# Patient Record
Sex: FEMALE | Race: BLACK OR AFRICAN AMERICAN | Employment: FULL TIME | URBAN - METROPOLITAN AREA
[De-identification: names, ages, dates, MRNs, and addresses within clinical notes are randomized per-mention and may not be internally consistent; named-entity substitution may affect disease eponyms.]

---

## 2018-09-09 ENCOUNTER — HOSPITAL ENCOUNTER (EMERGENCY)
Age: 18
Discharge: HOME | End: 2018-09-09
Attending: EMERGENCY MEDICINE
Payer: COMMERCIAL

## 2018-09-09 VITALS
WEIGHT: 182 LBS | HEIGHT: 60 IN | BODY MASS INDEX: 35.73 KG/M2 | SYSTOLIC BLOOD PRESSURE: 116 MMHG | DIASTOLIC BLOOD PRESSURE: 84 MMHG | TEMPERATURE: 98.4 F | RESPIRATION RATE: 18 BRPM | HEART RATE: 84 BPM | OXYGEN SATURATION: 99 %

## 2018-09-09 DIAGNOSIS — J06.9 ACUTE UPPER RESPIRATORY INFECTION: Primary | ICD-10-CM

## 2018-09-09 PROCEDURE — 99282 EMERGENCY DEPT VISIT SF MDM: CPT

## 2018-09-09 RX ORDER — AZITHROMYCIN 250 MG/1
TABLET, FILM COATED ORAL
Qty: 6 TAB | Refills: 0 | Status: SHIPPED | OUTPATIENT
Start: 2018-09-09 | End: 2018-09-14

## 2018-09-09 RX ORDER — GUAIFENESIN 100 MG/5ML
400 SOLUTION ORAL
Qty: 1 BOTTLE | Refills: 0 | Status: SHIPPED | OUTPATIENT
Start: 2018-09-09 | End: 2018-11-19

## 2018-09-09 RX ORDER — IBUPROFEN 600 MG/1
600 TABLET ORAL
Qty: 20 TAB | Refills: 0 | Status: SHIPPED | OUTPATIENT
Start: 2018-09-09 | End: 2018-11-19

## 2018-09-09 RX ORDER — OXYMETAZOLINE HCL 0.05 %
2 SPRAY, NON-AEROSOL (ML) NASAL 2 TIMES DAILY
Qty: 1 EACH | Refills: 0 | Status: SHIPPED | OUTPATIENT
Start: 2018-09-09 | End: 2018-09-12

## 2018-09-09 NOTE — DISCHARGE INSTRUCTIONS
Learning About COPD and Upper Respiratory Infections  What are upper respiratory infections? An upper respiratory infection, also called a URI, is an infection of the nose, sinuses, or throat. Viruses or bacteria can cause URIs. Colds, the flu, and sinusitis are examples of URIs. These infections are spread by coughs, sneezes, and close contact. How do these infections affect COPD? A URI can worsen COPD symptoms, such as having too much mucus in your lungs, coughing, or being short of breath. What can you do to manage most infections at home? · Do not smoke. Avoid secondhand smoke. · Take an over-the-counter pain medicine, such as acetaminophen (Tylenol), ibuprofen (Advil, Motrin), or naproxen (Aleve). Read and follow all instructions on the label. · Be careful when taking over-the-counter cold or flu medicines and Tylenol at the same time. Many of these medicines have acetaminophen, which is Tylenol. Read the labels to make sure that you are not taking more than the recommended dose. Too much acetaminophen (Tylenol) can be harmful. · If your doctor prescribed antibiotics, take them as directed. Do not stop taking them just because you feel better. You need to take the full course of antibiotics. · Ask your doctor about cough medicines and decongestants. Some doctors recommend these medicines, while others feel that they do not help. · Learn breathing techniques for COPD, such as breathing through pursed lips. These techniques can help you breathe easier. What can you do to prevent these infections? Stay healthy  · Do not smoke. This is the most important step you can take to prevent more damage to your lungs. If you need help quitting, talk to your doctor about stop-smoking programs and medicines. These can increase your chances of quitting for good. · Avoid secondhand smoke, air pollution, and high altitudes. Also avoid cold, dry air and hot, humid air.  Stay at home with your windows closed when air pollution is bad. · Get a flu shot every year. · Get a pneumococcal vaccine shot. If you have had one before, ask your doctor whether you need another dose. · If you must be around people with colds or the flu, wash your hands often. Exercise and eat well  · If your doctor recommends it, get more exercise. Walking is a good choice. Bit by bit, increase the amount you walk every day. Try for at least 30 minutes on most days of the week. · Eat regular, well-balanced meals. Eating right keeps your energy levels up and helps your body fight infection. · Get plenty of rest and sleep. Follow-up care is a key part of your treatment and safety. Be sure to make and go to all appointments, and call your doctor if you are having problems. It's also a good idea to know your test results and keep a list of the medicines you take. Where can you learn more? Go to http://josé-thuy.info/. Enter Y271 in the search box to learn more about \"Learning About COPD and Upper Respiratory Infections. \"  Current as of: December 6, 2017  Content Version: 11.7  © 1599-8911 [employer]. Care instructions adapted under license by [employer] (which disclaims liability or warranty for this information). If you have questions about a medical condition or this instruction, always ask your healthcare professional. [address] any warranty or liability for your use of this information.
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
## 2018-09-09 NOTE — LETTER
[facility] EMERGENCY DEPT 
[address] [facility] [address] [id number] 
[phone] Work/School Note Date: 9/9/2018 To Whom It May concern: 
 
Pauly Fabian was seen and treated today in the emergency room by the following provider(s): 
Attending Provider: Sandra School, MD 
Nurse Practitioner: Laura Quan NP. Pauly Fabian may return to work on 9-11. Sincerely, Laura Quan NP

## 2018-09-10 NOTE — ED NOTES
Patient (s)  given copy of dc instructions and 4 script(s). Patient (s)  verbalized understanding of instructions and script (s). Patient given a current medication reconciliation form and verbalized understanding of their medications. Patient (s) verbalized understanding of the importance of discussing medications with  his or her physician or clinic they will be following up with. Patient alert and oriented and in no acute distress. Patient discharged home ambulatory with friend/family.

## 2018-09-10 NOTE — ED NOTES
Emergency Department Nursing Plan of Care       The Nursing Plan of Care is developed from the Nursing assessment and Emergency Department Attending provider initial evaluation. The plan of care may be reviewed in the ED Provider note.     The Plan of Care was developed with the following considerations:   Patient / Family readiness to learn indicated by:verbalized understanding  Persons(s) to be included in education: patient  Barriers to Learning/Limitations:No    Signed     Racheal Ponce RN    9/9/2018   8:10 PM

## 2018-11-19 ENCOUNTER — HOSPITAL ENCOUNTER (EMERGENCY)
Age: 18
Discharge: HOME | End: 2018-11-19
Attending: EMERGENCY MEDICINE
Payer: COMMERCIAL

## 2018-11-19 VITALS
WEIGHT: 182 LBS | HEART RATE: 70 BPM | TEMPERATURE: 98.3 F | SYSTOLIC BLOOD PRESSURE: 121 MMHG | DIASTOLIC BLOOD PRESSURE: 69 MMHG | BODY MASS INDEX: 35.73 KG/M2 | OXYGEN SATURATION: 100 % | RESPIRATION RATE: 17 BRPM | HEIGHT: 60 IN

## 2018-11-19 DIAGNOSIS — R51.9 ACUTE NONINTRACTABLE HEADACHE, UNSPECIFIED HEADACHE TYPE: Primary | ICD-10-CM

## 2018-11-19 DIAGNOSIS — R11.0 NAUSEA WITHOUT VOMITING: ICD-10-CM

## 2018-11-19 LAB
ALBUMIN SERPL-MCNC: 3.2 G/DL (ref 3.5–5)
ALBUMIN/GLOB SERPL: 0.8 {RATIO} (ref 1.1–2.2)
ALP SERPL-CCNC: 63 U/L (ref 45–117)
ALT SERPL-CCNC: 15 U/L (ref 12–78)
ANION GAP SERPL CALC-SCNC: 5 MMOL/L (ref 5–15)
APPEARANCE UR: ABNORMAL
AST SERPL-CCNC: 13 U/L (ref 15–37)
BACTERIA URNS QL MICRO: ABNORMAL /HPF
BASOPHILS # BLD: 0.1 K/UL (ref 0–0.1)
BASOPHILS NFR BLD: 1 % (ref 0–1)
BILIRUB SERPL-MCNC: 0.2 MG/DL (ref 0.2–1)
BILIRUB UR QL: NEGATIVE
BUN SERPL-MCNC: 6 MG/DL (ref 6–20)
BUN/CREAT SERPL: 7 (ref 12–20)
CALCIUM SERPL-MCNC: 8.4 MG/DL (ref 8.5–10.1)
CHLORIDE SERPL-SCNC: 103 MMOL/L (ref 97–108)
CO2 SERPL-SCNC: 31 MMOL/L (ref 21–32)
COLOR UR: ABNORMAL
CREAT SERPL-MCNC: 0.87 MG/DL (ref 0.55–1.02)
DIFFERENTIAL METHOD BLD: NORMAL
EOSINOPHIL # BLD: 0.1 K/UL (ref 0–0.4)
EOSINOPHIL NFR BLD: 2 % (ref 0–7)
EPITH CASTS URNS QL MICRO: ABNORMAL /LPF
ERYTHROCYTE [DISTWIDTH] IN BLOOD BY AUTOMATED COUNT: 12.4 % (ref 11.5–14.5)
GLOBULIN SER CALC-MCNC: 4 G/DL (ref 2–4)
GLUCOSE SERPL-MCNC: 95 MG/DL (ref 65–100)
GLUCOSE UR STRIP.AUTO-MCNC: NEGATIVE MG/DL
HCG UR QL: NEGATIVE
HCT VFR BLD AUTO: 39 % (ref 35–47)
HGB BLD-MCNC: 12.5 G/DL (ref 11.5–16)
HGB UR QL STRIP: ABNORMAL
IMM GRANULOCYTES # BLD: 0 K/UL (ref 0–0.04)
IMM GRANULOCYTES NFR BLD AUTO: 0 % (ref 0–0.5)
KETONES UR QL STRIP.AUTO: NEGATIVE MG/DL
LEUKOCYTE ESTERASE UR QL STRIP.AUTO: ABNORMAL
LIPASE SERPL-CCNC: 84 U/L (ref 73–393)
LYMPHOCYTES # BLD: 2 K/UL (ref 0.8–3.5)
LYMPHOCYTES NFR BLD: 35 % (ref 12–49)
MCH RBC QN AUTO: 27.1 PG (ref 26–34)
MCHC RBC AUTO-ENTMCNC: 32.1 G/DL (ref 30–36.5)
MCV RBC AUTO: 84.4 FL (ref 80–99)
MONOCYTES # BLD: 0.5 K/UL (ref 0–1)
MONOCYTES NFR BLD: 8 % (ref 5–13)
MUCOUS THREADS URNS QL MICRO: ABNORMAL /LPF
NEUTS SEG # BLD: 3 K/UL (ref 1.8–8)
NEUTS SEG NFR BLD: 53 % (ref 32–75)
NITRITE UR QL STRIP.AUTO: NEGATIVE
NRBC # BLD: 0 K/UL (ref 0–0.01)
NRBC BLD-RTO: 0 PER 100 WBC
PH UR STRIP: 6.5 [PH] (ref 5–8)
PLATELET # BLD AUTO: 275 K/UL (ref 150–400)
PMV BLD AUTO: 10.2 FL (ref 8.9–12.9)
POTASSIUM SERPL-SCNC: 3.7 MMOL/L (ref 3.5–5.1)
PROT SERPL-MCNC: 7.2 G/DL (ref 6.4–8.2)
PROT UR STRIP-MCNC: 30 MG/DL
RBC # BLD AUTO: 4.62 M/UL (ref 3.8–5.2)
RBC #/AREA URNS HPF: ABNORMAL /HPF (ref 0–5)
SODIUM SERPL-SCNC: 139 MMOL/L (ref 136–145)
SP GR UR REFRACTOMETRY: 1.02 (ref 1–1.03)
UROBILINOGEN UR QL STRIP.AUTO: 1 EU/DL (ref 0.2–1)
WBC # BLD AUTO: 5.7 K/UL (ref 3.6–11)
WBC URNS QL MICRO: ABNORMAL /HPF (ref 0–4)
YEAST URNS QL MICRO: PRESENT

## 2018-11-19 PROCEDURE — 36415 COLL VENOUS BLD VENIPUNCTURE: CPT

## 2018-11-19 PROCEDURE — 85025 COMPLETE CBC W/AUTO DIFF WBC: CPT

## 2018-11-19 PROCEDURE — 81025 URINE PREGNANCY TEST: CPT

## 2018-11-19 PROCEDURE — 99284 EMERGENCY DEPT VISIT MOD MDM: CPT

## 2018-11-19 PROCEDURE — 81001 URINALYSIS AUTO W/SCOPE: CPT

## 2018-11-19 PROCEDURE — 80053 COMPREHEN METABOLIC PANEL: CPT

## 2018-11-19 PROCEDURE — 74011250637 HC RX REV CODE- 250/637: Performed by: EMERGENCY MEDICINE

## 2018-11-19 PROCEDURE — 83690 ASSAY OF LIPASE: CPT

## 2018-11-19 RX ORDER — IBUPROFEN 400 MG/1
800 TABLET ORAL
Status: COMPLETED | OUTPATIENT
Start: 2018-11-19 | End: 2018-11-19

## 2018-11-19 RX ORDER — IBUPROFEN 800 MG/1
800 TABLET ORAL
Qty: 30 TAB | Refills: 0 | Status: SHIPPED | OUTPATIENT
Start: 2018-11-19 | End: 2019-02-12

## 2018-11-19 RX ORDER — ONDANSETRON 4 MG/1
4 TABLET, ORALLY DISINTEGRATING ORAL
Qty: 10 TAB | Refills: 0 | Status: SHIPPED | OUTPATIENT
Start: 2018-11-19 | End: 2019-02-12

## 2018-11-19 RX ORDER — ONDANSETRON 4 MG/1
4 TABLET, ORALLY DISINTEGRATING ORAL
Status: COMPLETED | OUTPATIENT
Start: 2018-11-19 | End: 2018-11-19

## 2018-11-19 RX ADMIN — ONDANSETRON 4 MG: 4 TABLET, ORALLY DISINTEGRATING ORAL at 11:01

## 2018-11-19 RX ADMIN — IBUPROFEN 800 MG: 400 TABLET ORAL at 11:01

## 2018-11-19 NOTE — DISCHARGE INSTRUCTIONS
Headache: Care Instructions  Your Care Instructions    Headaches have many possible causes. Most headaches aren't a sign of a more serious problem, and they will get better on their own. Home treatment may help you feel better faster. The doctor has checked you carefully, but problems can develop later. If you notice any problems or new symptoms, get medical treatment right away. Follow-up care is a key part of your treatment and safety. Be sure to make and go to all appointments, and call your doctor if you are having problems. It's also a good idea to know your test results and keep a list of the medicines you take. How can you care for yourself at home? · Do not drive if you have taken a prescription pain medicine. · Rest in a quiet, dark room until your headache is gone. Close your eyes and try to relax or go to sleep. Don't watch TV or read. · Put a cold, moist cloth or cold pack on the painful area for 10 to 20 minutes at a time. Put a thin cloth between the cold pack and your skin. · Use a warm, moist towel or a heating pad set on low to relax tight shoulder and neck muscles. · Have someone gently massage your neck and shoulders. · Take pain medicines exactly as directed. ? If the doctor gave you a prescription medicine for pain, take it as prescribed. ? If you are not taking a prescription pain medicine, ask your doctor if you can take an over-the-counter medicine. · Be careful not to take pain medicine more often than the instructions allow, because you may get worse or more frequent headaches when the medicine wears off. · Do not ignore new symptoms that occur with a headache, such as a fever, weakness or numbness, vision changes, or confusion. These may be signs of a more serious problem. To prevent headaches  · Keep a headache diary so you can figure out what triggers your headaches. Avoiding triggers may help you prevent headaches.  Record when each headache began, how long it lasted, and what the pain was like (throbbing, aching, stabbing, or dull). Write down any other symptoms you had with the headache, such as nausea, flashing lights or dark spots, or sensitivity to bright light or loud noise. Note if the headache occurred near your period. List anything that might have triggered the headache, such as certain foods (chocolate, cheese, wine) or odors, smoke, bright light, stress, or lack of sleep. · Find healthy ways to deal with stress. Headaches are most common during or right after stressful times. Take time to relax before and after you do something that has caused a headache in the past.  · Try to keep your muscles relaxed by keeping good posture. Check your jaw, face, neck, and shoulder muscles for tension, and try relaxing them. When sitting at a desk, change positions often, and stretch for 30 seconds each hour. · Get plenty of sleep and exercise. · Eat regularly and well. Long periods without food can trigger a headache. · Treat yourself to a massage. Some people find that regular massages are very helpful in relieving tension. · Limit caffeine by not drinking too much coffee, tea, or soda. But don't quit caffeine suddenly, because that can also give you headaches. · Reduce eyestrain from computers by blinking frequently and looking away from the computer screen every so often. Make sure you have proper eyewear and that your monitor is set up properly, about an arm's length away. · Seek help if you have depression or anxiety. Your headaches may be linked to these conditions. Treatment can both prevent headaches and help with symptoms of anxiety or depression. When should you call for help? Call 911 anytime you think you may need emergency care. For example, call if:    · You have signs of a stroke. These may include:  ? Sudden numbness, paralysis, or weakness in your face, arm, or leg, especially on only one side of your body. ? Sudden vision changes.   ? Sudden trouble speaking. ? Sudden confusion or trouble understanding simple statements. ? Sudden problems with walking or balance. ? A sudden, severe headache that is different from past headaches.    Call your doctor now or seek immediate medical care if:    · You have a new or worse headache.     · Your headache gets much worse. Where can you learn more? Go to http://josé-htuy.info/. Enter M271 in the search box to learn more about \"Headache: Care Instructions. \"  Current as of: June 4, 2018  Content Version: 11.8  © 3256-6376 [employer]. Care instructions adapted under license by [employer] (which disclaims liability or warranty for this information). If you have questions about a medical condition or this instruction, always ask your healthcare professional. [address] any warranty or liability for your use of this information. Nausea and Vomiting: Care Instructions  Your Care Instructions    When you are nauseated, you may feel weak and sweaty and notice a lot of saliva in your mouth. Nausea often leads to vomiting. Most of the time you do not need to worry about nausea and vomiting, but they can be signs of other illnesses. Two common causes of nausea and vomiting are stomach flu and food poisoning. Nausea and vomiting from viral stomach flu will usually start to improve within 24 hours. Nausea and vomiting from food poisoning may last from 12 to 48 hours. The doctor has checked you carefully, but problems can develop later. If you notice any problems or new symptoms, get medical treatment right away. Follow-up care is a key part of your treatment and safety. Be sure to make and go to all appointments, and call your doctor if you are having problems. It's also a good idea to know your test results and keep a list of the medicines you take. How can you care for yourself at home?   · To prevent dehydration, drink plenty of fluids, enough so that your urine is light yellow or clear like water. Choose water and other caffeine-free clear liquids until you feel better. If you have kidney, heart, or liver disease and have to limit fluids, talk with your doctor before you increase the amount of fluids you drink. · Rest in bed until you feel better. · When you are able to eat, try clear soups, mild foods, and liquids until all symptoms are gone for 12 to 48 hours. Other good choices include dry toast, crackers, cooked cereal, and gelatin dessert, such as Jell-O. When should you call for help? Call 911 anytime you think you may need emergency care. For example, call if:    · You passed out (lost consciousness).    Call your doctor now or seek immediate medical care if:    · You have symptoms of dehydration, such as:  ? Dry eyes and a dry mouth. ? Passing only a little dark urine. ? Feeling thirstier than usual.     · You have new or worsening belly pain.     · You have a new or higher fever.     · You vomit blood or what looks like coffee grounds.    Watch closely for changes in your health, and be sure to contact your doctor if:    · You have ongoing nausea and vomiting.     · Your vomiting is getting worse.     · Your vomiting lasts longer than 2 days.     · You are not getting better as expected. Where can you learn more? Go to http://josé-thuy.info/. Enter 25 [id number] in the search box to learn more about \"Nausea and Vomiting: Care Instructions. \"  Current as of: November 20, 2017  Content Version: 11.8  © 7110-0287 [employer]. Care instructions adapted under license by [employer] (which disclaims liability or warranty for this information). If you have questions about a medical condition or this instruction, always ask your healthcare professional. [address] any warranty or liability for your use of this information.

## 2018-11-19 NOTE — ED NOTES
Pt given printed discharge instructions and 2 script(s). Pt verbalized understanding of instructions and script(s). Pt verbalized importance of following up with PCP. Pt alert and oriented, in no acute distress, ambulatory with friend.
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
## 2018-11-19 NOTE — ED NOTES
Pt reports frontal headache that began Saturday night, reports frequent headaches, pt still having a headache and it is currently on the right side; reports nausea and dizziness with standing, denies vomiting

## 2018-11-19 NOTE — LETTER
[facility] EMERGENCY DEPT 
[address] [facility] [address] [address] [id number] [phone] Work/School Note Date: 11/19/2018 To Whom It May concern: 
 
Olena Fuller was seen and treated today in the emergency room by the following provider(s): 
Attending Provider: Mireille Rodriguez MD. Olena Fuller may return to work on 11/20/18. Sincerely, May Villanueva MD

## 2019-02-12 ENCOUNTER — HOSPITAL ENCOUNTER (EMERGENCY)
Age: 19
Discharge: HOME | End: 2019-02-12
Attending: EMERGENCY MEDICINE
Payer: COMMERCIAL

## 2019-02-12 VITALS
BODY MASS INDEX: 35.3 KG/M2 | WEIGHT: 187 LBS | DIASTOLIC BLOOD PRESSURE: 85 MMHG | HEIGHT: 61 IN | HEART RATE: 97 BPM | TEMPERATURE: 98.8 F | RESPIRATION RATE: 18 BRPM | OXYGEN SATURATION: 100 % | SYSTOLIC BLOOD PRESSURE: 132 MMHG

## 2019-02-12 DIAGNOSIS — J10.1 INFLUENZA A: Primary | ICD-10-CM

## 2019-02-12 LAB
FLUAV AG NPH QL IA: POSITIVE
FLUBV AG NOSE QL IA: NEGATIVE

## 2019-02-12 PROCEDURE — 74011250637 HC RX REV CODE- 250/637: Performed by: EMERGENCY MEDICINE

## 2019-02-12 PROCEDURE — 87804 INFLUENZA ASSAY W/OPTIC: CPT

## 2019-02-12 PROCEDURE — 99283 EMERGENCY DEPT VISIT LOW MDM: CPT

## 2019-02-12 RX ORDER — ONDANSETRON 4 MG/1
4 TABLET, ORALLY DISINTEGRATING ORAL
Status: COMPLETED | OUTPATIENT
Start: 2019-02-12 | End: 2019-02-12

## 2019-02-12 RX ORDER — ACETAMINOPHEN 500 MG
1000 TABLET ORAL ONCE
Status: COMPLETED | OUTPATIENT
Start: 2019-02-12 | End: 2019-02-12

## 2019-02-12 RX ORDER — ONDANSETRON 4 MG/1
4 TABLET, ORALLY DISINTEGRATING ORAL
Qty: 10 TAB | Refills: 0 | Status: SHIPPED | OUTPATIENT
Start: 2019-02-12 | End: 2020-01-19

## 2019-02-12 RX ORDER — OSELTAMIVIR PHOSPHATE 75 MG/1
75 CAPSULE ORAL DAILY
Qty: 4 CAP | Refills: 0 | Status: SHIPPED | OUTPATIENT
Start: 2019-02-12 | End: 2019-02-16

## 2019-02-12 RX ORDER — OSELTAMIVIR PHOSPHATE 75 MG/1
75 CAPSULE ORAL
Status: COMPLETED | OUTPATIENT
Start: 2019-02-12 | End: 2019-02-12

## 2019-02-12 RX ADMIN — ACETAMINOPHEN 1000 MG: 500 TABLET, FILM COATED ORAL at 19:30

## 2019-02-12 RX ADMIN — ONDANSETRON 4 MG: 4 TABLET, ORALLY DISINTEGRATING ORAL at 19:30

## 2019-02-12 RX ADMIN — OSELTAMIVIR PHOSPHATE 75 MG: 75 CAPSULE ORAL at 20:34

## 2019-02-12 NOTE — LETTER
[facility] EMERGENCY DEPT 
[address] [facility] [address] [facility] [address] [id number] [phone] Work/School Note Date: 2/12/2019 To Whom It May concern: 
 
Melania Villegas was seen and treated today in the emergency room by the following provider(s): 
Attending Provider: Kavin Freedman MD. Melania Villegas may return to work on 2/15/19 or when you have not had any fever for at least 24 hours. Sincerely, Kavin Freedman MD
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
## 2019-02-13 NOTE — ED NOTES
Patient  given copy of dc instructions and 1 script sent to pharmacy  Patient  verbalized understanding of instructions and script (s). Patient given a current medication reconciliation form and verbalized understanding of their medications. Patient verbalized understanding of the importance of discussing medications with  his or her physician or clinic they will be following up with. Patient alert and oriented and in no acute distress. Patient discharged home ambulatory.

## 2019-02-13 NOTE — ED TRIAGE NOTES
Pt c/o productive cough x 3 days with yellow mucous, fevers at home, mild abd pain, diarrhea, and 3 episodes of vomiting.

## 2019-02-13 NOTE — DISCHARGE INSTRUCTIONS
Patient Education        Influenza (Flu): Care Instructions  Your Care Instructions    Influenza (flu) is an infection in the lungs and breathing passages. It is caused by the influenza virus. There are different strains, or types, of the flu virus from year to year. Unlike the common cold, the flu comes on suddenly and the symptoms, such as a cough, congestion, fever, chills, fatigue, aches, and pains, are more severe. These symptoms may last up to 10 days. Although the flu can make you feel very sick, it usually doesn't cause serious health problems. Home treatment is usually all you need for flu symptoms. But your doctor may prescribe antiviral medicine to prevent other health problems, such as pneumonia, from developing. Older people and those who have a long-term health condition, such as lung disease, are most at risk for having pneumonia or other health problems. Follow-up care is a key part of your treatment and safety. Be sure to make and go to all appointments, and call your doctor if you are having problems. It's also a good idea to know your test results and keep a list of the medicines you take. How can you care for yourself at home? · Get plenty of rest.  · Drink plenty of fluids, enough so that your urine is light yellow or clear like water. If you have kidney, heart, or liver disease and have to limit fluids, talk with your doctor before you increase the amount of fluids you drink. · Take an over-the-counter pain medicine if needed, such as acetaminophen (Tylenol), ibuprofen (Advil, Motrin), or naproxen (Aleve), to relieve fever, headache, and muscle aches. Read and follow all instructions on the label. No one younger than 20 should take aspirin. It has been linked to Reye syndrome, a serious illness. · Do not smoke. Smoking can make the flu worse. If you need help quitting, talk to your doctor about stop-smoking programs and medicines.  These can increase your chances of quitting for good.  · Breathe moist air from a hot shower or from a sink filled with hot water to help clear a stuffy nose. · Before you use cough and cold medicines, check the label. These medicines may not be safe for young children or for people with certain health problems. · If the skin around your nose and lips becomes sore, put some petroleum jelly on the area. · To ease coughing:  ? Drink fluids to soothe a scratchy throat. ? Suck on cough drops or plain hard candy. ? Take an over-the-counter cough medicine that contains dextromethorphan to help you get some sleep. Read and follow all instructions on the label. ? Raise your head at night with an extra pillow. This may help you rest if coughing keeps you awake. · Take any prescribed medicine exactly as directed. Call your doctor if you think you are having a problem with your medicine. To avoid spreading the flu  · Wash your hands regularly, and keep your hands away from your face. · Stay home from school, work, and other public places until you are feeling better and your fever has been gone for at least 24 hours. The fever needs to have gone away on its own without the help of medicine. · Ask people living with you to talk to their doctors about preventing the flu. They may get antiviral medicine to keep from getting the flu from you. · To prevent the flu in the future, get a flu vaccine every fall. Encourage people living with you to get the vaccine. · Cover your mouth when you cough or sneeze. When should you call for help? Call 911 anytime you think you may need emergency care.  For example, call if:    · You have severe trouble breathing.    Call your doctor now or seek immediate medical care if:    · You have new or worse trouble breathing.     · You seem to be getting much sicker.     · You feel very sleepy or confused.     · You have a new or higher fever.     · You get a new rash.    Watch closely for changes in your health, and be sure to contact your doctor if:    · You begin to get better and then get worse.     · You are not getting better after 1 week. Where can you learn more? Go to http://josé-thuy.info/. Enter Y434 in the search box to learn more about \"Influenza (Flu): Care Instructions. \"  Current as of: September 5, 2018  Content Version: 11.9  © 2179-1702 [employer]. Care instructions adapted under license by [employer] (which disclaims liability or warranty for this information). If you have questions about a medical condition or this instruction, always ask your healthcare professional. [place] [address] any warranty or liability for your use of this information.

## 2019-02-13 NOTE — ED NOTES
Pt arrived to ED  with c/o generalized body ache with n/v x 1 day. Patient is 8 weeks pregnant. . Pt is in no acute distress. Will continue to monitor. See nursing assessment. Safety precautions in place; call light within reach. Emergency Department Nursing Plan of Care       The Nursing Plan of Care is developed from the Nursing assessment and Emergency Department Attending provider initial evaluation. The plan of care may be reviewed in the ED Provider note.     The Plan of Care was developed with the following considerations:   Patient / Family readiness to learn indicated by:verbalized understanding  Persons(s) to be included in education: patient  Barriers to Learning/Limitations:No    Signed     Jason Arce RN    2/12/2019   7:21 PM

## 2020-01-19 ENCOUNTER — OFFICE VISIT (OUTPATIENT)
Dept: URGENT CARE | Age: 20
End: 2020-01-19

## 2020-01-19 VITALS
DIASTOLIC BLOOD PRESSURE: 76 MMHG | WEIGHT: 203 LBS | SYSTOLIC BLOOD PRESSURE: 129 MMHG | HEART RATE: 82 BPM | BODY MASS INDEX: 38.33 KG/M2 | HEIGHT: 61 IN | OXYGEN SATURATION: 100 % | TEMPERATURE: 97.3 F | RESPIRATION RATE: 18 BRPM
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
## 2020-01-19 DIAGNOSIS — Z3A.22 22 WEEKS GESTATION OF PREGNANCY: ICD-10-CM

## 2020-01-19 DIAGNOSIS — S80.11XA CONTUSION OF RIGHT LOWER EXTREMITY, INITIAL ENCOUNTER: ICD-10-CM

## 2020-01-19 DIAGNOSIS — S29.012A UPPER BACK STRAIN, INITIAL ENCOUNTER: Primary | ICD-10-CM
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
## 2020-01-19 NOTE — PATIENT INSTRUCTIONS
Go to the emergency department for any abdominal pain, vaginal discharge, vaginal bleeding   Otherwise follow up with PCP in 1 week for re evaluation. Cool or warm compresses  The only medication you can take for pain while pregnant is over the counter tylenol- take as directed. Musculoskeletal Pain: Care Instructions  Your Care Instructions    Different problems with the bones, muscles, nerves, ligaments, and tendons in the body can cause pain. One or more areas of your body may ache or burn. Or they may feel tired, stiff, or sore. The medical term for this type of pain is musculoskeletal pain. It can have many different causes. Sometimes the pain is caused by an injury such as a strain or sprain. Or you might have pain from using one part of your body in the same way over and over again. This is called overuse. In some cases, the cause of the pain is another health problem such as arthritis or fibromyalgia. The doctor will examine you and ask you questions about your health to help find the cause of your pain. Blood tests or imaging tests like an X-ray may also be helpful. But sometimes doctors can't find a cause of the pain. Treatment depends on your symptoms and the cause of the pain, if known. The doctor has checked you carefully, but problems can develop later. If you notice any problems or new symptoms, get medical treatment right away. Follow-up care is a key part of your treatment and safety. Be sure to make and go to all appointments, and call your doctor if you are having problems. It's also a good idea to know your test results and keep a list of the medicines you take. How can you care for yourself at home? · Rest until you feel better. · Do not do anything that makes the pain worse. · Be safe with medicines. Read and follow all instructions on the label. ? If the doctor gave you a prescription medicine for pain, take it as prescribed.   ? If you are not taking a prescription pain medicine, ask your doctor if you can take an over-the-counter medicine. · Put ice or a cold pack on the area for 10 to 20 minutes at a time to ease pain. Put a thin cloth between the ice and your skin. When should you call for help? Call your doctor now or seek immediate medical care if:    · You have new pain, or your pain gets worse.     · You have new symptoms such as a fever, a rash, or chills.    Watch closely for changes in your health, and be sure to contact your doctor if:    · You do not get better as expected. Where can you learn more? Go to http://josé-thuy.info/. Enter C095 in the search box to learn more about \"Musculoskeletal Pain: Care Instructions. \"  Current as of: March 28, 2019  Content Version: 12.2  © 0976-6946 [employer]. Care instructions adapted under license by [employer] (which disclaims liability or warranty for this information). If you have questions about a medical condition or this instruction, always ask your healthcare professional. [address] any warranty or liability for your use of this information.

## 2020-01-23 ENCOUNTER — OFFICE VISIT (OUTPATIENT)
Dept: FAMILY MEDICINE CLINIC | Age: 20
End: 2020-01-23

## 2020-01-23 VITALS
SYSTOLIC BLOOD PRESSURE: 110 MMHG | TEMPERATURE: 98.2 F | HEIGHT: 61 IN | HEART RATE: 103 BPM | BODY MASS INDEX: 38.51 KG/M2 | OXYGEN SATURATION: 99 % | WEIGHT: 204 LBS | DIASTOLIC BLOOD PRESSURE: 80 MMHG | RESPIRATION RATE: 18 BRPM

## 2020-01-23 DIAGNOSIS — S20.221A CONTUSION OF RIGHT SIDE OF BACK, INITIAL ENCOUNTER: Primary | ICD-10-CM

## 2020-01-23 DIAGNOSIS — S70.01XA CONTUSION OF RIGHT HIP, INITIAL ENCOUNTER: ICD-10-CM

## 2020-01-23 NOTE — PROGRESS NOTES
Here for f/u post fall. Still pain along right side. Sees Ob next week. Not taking tylenol. Denies bruising. Patient denies chest pain, dyspnea, unexpected weight change, unexpected pain, mood or memory changes. Visit Vitals  /80 (BP 1 Location: Left arm, BP Patient Position: Sitting)   Pulse (!) 103   Temp 98.2 °F (36.8 °C) (Oral)   Resp 18   Ht 5' 1\" (1.549 m)   Wt 204 lb (92.5 kg)   SpO2 99%   BMI 38.55 kg/m²     Patient alert and cooperative. Reviewed above. Assessment:  1. Musculoskeletal soreness post fall. Plan:  1. Check with OB to see if physical therapy is okay, for now can do hot showers, Tylenol, stretches. 2. Follow otherwise here prn.

## 2020-01-23 NOTE — PROGRESS NOTES
Rashi Dickens is a 22 y.o. female  HIPAA verified by two patient identifiers. Health Maintenance Due   Topic    DTaP/Tdap/Td series (7 - Td)    PAP AKA CERVICAL CYTOLOGY     Influenza Age 5 to Adult      Chief Complaint   Patient presents with    Follow-up     From [place] fall in [employer] , 23 weeks pregnant     Visit Vitals  /80 (BP 1 Location: Left arm, BP Patient Position: Sitting)   Pulse (!) 103   Temp 98.2 °F (36.8 °C) (Oral)   Resp 18   Ht 5' 1\" (1.549 m)   Wt 204 lb (92.5 kg)   SpO2 99%   BMI 38.55 kg/m²       Pain Scale: 5/10  Pain Location: Back    1. Have you been to the ER, urgent care clinic since your last visit? Hospitalized since your last visit? No    2. Have you seen or consulted any other health care providers outside of the [address] [place] [address] since your last visit? Include any pap smears or colon screening.  No

## 2020-02-04 ENCOUNTER — TELEPHONE (OUTPATIENT)
Dept: FAMILY MEDICINE CLINIC | Age: 20
End: 2020-02-04

## 2020-02-04 NOTE — TELEPHONE ENCOUNTER
----- Message from Ian Paz sent at 2/4/2020  2:13 PM EST -----  Regarding: Dr. Lan Tang stated that she would like advise that her OB-GYN has approved her for Physical Therapy and she would like to get scheduled. She states that Dr. Beti Griffin advised to let him know if she was approved. Best contact number is [phone].

## 2020-02-06 DIAGNOSIS — S39.92XA INJURY OF BACK DUE TO FALL, INITIAL ENCOUNTER: Primary | ICD-10-CM
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
## 2020-02-11 ENCOUNTER — TELEPHONE (OUTPATIENT)
Dept: FAMILY MEDICINE CLINIC | Age: 20
End: 2020-02-11

## 2020-02-11 NOTE — TELEPHONE ENCOUNTER
----- Message from Naz Montano sent at 2/11/2020 10:22 AM EST -----  Regarding: Dr. Radha Marks: [phone]  General Message/Vendor Calls    Caller's first and last [address]      Reason for call: pt is requesting a physical therapist . Pt is pregnant.       Callback required yes/no and why:yes      Best contact number(s):[phone]      Details to clarify the request: this is patients 2nd request      Naz Montano

## 2020-02-17 ENCOUNTER — TELEPHONE (OUTPATIENT)
Dept: FAMILY MEDICINE CLINIC | Age: 20
End: 2020-02-17

## 2020-02-17 NOTE — TELEPHONE ENCOUNTER
----- Message from Julian Rubinstein sent at 2/17/2020 10:52 AM EST -----  Regarding: Dr. Wheatley Main first and last name: José Miguel Showers  Reason for call:   told Pt to contact OBGYN about if she can do PT and the OBGYN, and pt been waiting for a call back so she can get a referral for PT as the Pt is currently pregnant.  Pt was last seen on 1/23/2020  Callback required yes/no and why: yes   Best contact number(s):  [phone] (update phone number on file to this one)  Details to clarify the request:

## 2020-02-17 NOTE — TELEPHONE ENCOUNTER
[facility]   [address]  [address] [place] [address]  Phone: [phone]  Fax: [phone]  [address] [place] [address] Ne - Referral Requisition      _________________________________________________________________________________________   Name:  Jamie Dover    MRN: [id number] : 2000              Sex: F   Address:  [address] [place] [address] Home: [address] [place] [address] Preferred*: [phone]      Order: REFERRAL TO PHYSICAL THERAPY  Class: Outpatient Referral                Referral Priority: Routine  Associated Dx: Injury of back due to fall, initial encounter (T25.83MQ,V30. [facility])     Comments:      If Coverage Data blank - Coverage Not Found. Primary Coverage Secondary Coverage   Payor: [facility] [[id number]]  Plan: [employer] [facility] [[id number]]  ID: [id number]     Subscriber Information:  Name: Moncho Webb  : 04/10/1980  Address: [address]: [facility]      Zip: [address]  Group No:  Payor:  []  Plan:  []  ID:      Subscriber Information:  Name: Edgar Ewing  : 2000  Address: [place] [address]  Zip: [address]  Group No:       Referral associated with order: [id number]     Referred by: Ayla Dorsey MD  Referral Reason: Specialty Services Required  Referred To:                                        Phone:  Fax:             Visits Requested: 1  Order Date: 2020  Expiration Date:          The patient stated that she no longer has united healthcare. She now has [employer] plus medicaid. The patient will like to go to [facility]. [facility] accepts [facility] plus. The patient do not need an authorization for medicaid.    [address] [place] [address]   Website  Address: Mayda Schilling[address]   Phone: [phone]  [phone]

## 2020-02-26 ENCOUNTER — TELEPHONE (OUTPATIENT)
Dept: FAMILY MEDICINE CLINIC | Age: 20
End: 2020-02-26

## 2020-02-26 NOTE — TELEPHONE ENCOUNTER
----- Message from Deb Sol sent at 2/25/2020 12:10 PM EST -----  Regarding: Dr. Pate Omer: [phone] (first and last name if not the patient or from practice): N/A  Caller's relationship to patient (if not from a practice):N/A  Best Contact : [phone]  Name of caller (if calling from a practice): N/A  Patient insurance Type [employer] Secondary  HMO   [facility] of [facility].    Specialist's title, first, and last name: N/A  Specialist Type: N/A  Office Phone Number: [phone]  Fax number: [phone]  Date and time of appointment: N/A

## 2020-02-27 ENCOUNTER — TELEPHONE (OUTPATIENT)
Dept: FAMILY MEDICINE CLINIC | Age: 20
End: 2020-02-27

## 2020-02-27 NOTE — TELEPHONE ENCOUNTER
----- Message from Nahum Ramirez sent at 2/27/2020  9:40 AM EST -----  Regarding: Read/telephone  Pt is requesting an order for PT at [facility] for upper back and right hip pain. She stated she did call on Tuesday and she has an appointment today at 1:00pm.Pts number is [phone] and [facility] number is [phone] and fax [phone].

## 2020-02-27 NOTE — TELEPHONE ENCOUNTER
Call to [phone] [facility] 149 and info on pt needed  Pt's appt at [address] is today at 1:30pm    Order and last OV note faxed to [phone] [phone]

## 2022-06-11 ENCOUNTER — OFFICE VISIT (OUTPATIENT)
Dept: URGENT CARE | Age: 22
End: 2022-06-11
Payer: COMMERCIAL

## 2022-06-11 VITALS
DIASTOLIC BLOOD PRESSURE: 65 MMHG | HEIGHT: 60 IN | OXYGEN SATURATION: 100 % | TEMPERATURE: 98.2 F | RESPIRATION RATE: 16 BRPM | SYSTOLIC BLOOD PRESSURE: 97 MMHG | WEIGHT: 179 LBS | BODY MASS INDEX: 35.14 KG/M2 | HEART RATE: 76 BPM

## 2022-06-11 DIAGNOSIS — H10.31 ACUTE CONJUNCTIVITIS OF RIGHT EYE, UNSPECIFIED ACUTE CONJUNCTIVITIS TYPE: Primary | ICD-10-CM

## 2022-06-11 PROCEDURE — 99212 OFFICE O/P EST SF 10 MIN: CPT | Performed by: FAMILY MEDICINE

## 2022-06-11 RX ORDER — ERYTHROMYCIN 5 MG/G
0.1 OINTMENT OPHTHALMIC EVERY 8 HOURS
Qty: 1 G | Refills: 0 | Status: SHIPPED | OUTPATIENT
Start: 2022-06-11 | End: 2022-06-14
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
## 2022-06-11 NOTE — PATIENT INSTRUCTIONS
What is pink eye? -- Pink eye is the everyday term people use to describe an infection or irritation of the eye. The medical term for pink eye is \"conjunctivitis. \"  If you have pink eye, your eye (or eyes) might:  ?Turn pink or red  ? Weep or ooze a gooey liquid  ? Become itchy or burn  ? Get stuck shut, especially when you first wake up  Pink eye can be caused by an infection, allergies, or an unknown irritation. Can you catch pink eye from someone else? -- Yes. When pink eye is caused by an infection, it can spread easily. Usually, people catch it from touching something that has been in contact with an infected person's eye. It can also be spread when an infected person touches someone else, and then that person touches their eyes. If someone you know has pink eye, avoid touching their pillowcases, towels, or other personal items. When should I see my doctor or nurse? -- See your doctor or nurse if your eye hurts, or if you still have trouble seeing clearly after blinking. If you do not have these problems, but think you might have pink eye, your doctor or nurse might be able to give you advice over the phone. Can pink eye be treated? -- Most cases of pink eye go away on their own without treatment. But some types of pink eye can be treated. When pink eye is caused by infection, it is usually caused by a virus, so antibiotics will not help. Still, pink eye caused by a virus can last several days. Pink eye caused by an infection with bacteria can be treated with antibiotic eye drops or gels. Pink eye caused by other problems can be treated with eye drops normally used to treat allergies. These drops will not cure the pink eye, but they can help with itchiness and irritation. When using eye drops for infection, do not touch your good eye after touching your infected eye. Also, do not touch the bottle or dropper directly onto one eye and then use it in the other.  Doing these things can cause the infection to spread from one eye to the other. What if I wear contact lenses? -- If you wear contact lenses and you have symptoms of pink eye, it is really important to have a doctor look at your eyes. In people who wear contacts, the symptoms of pink eye can be caused by \"corneal abrasion. \" Corneal abrasion is a scratch on the eye and can be a serious problem. During treatment for eye infections, you might need to stop wearing your contacts for a short time. If your contacts are disposable, you will want to throw them away and start fresh. If you contacts are not disposable, you will need to carefully clean them. You should also throw away your contact lens case and get a new one. Can pink eye be prevented? -- To keep from getting or spreading pink eye, wash your hands often with soap and water. The table has instructions on how to wash your hands to prevent spreading illness (table 1). Also, avoid sharing towels, bedding, or other personal items with a person who has pink eye.

## 2023-11-14 ENCOUNTER — OFFICE VISIT (OUTPATIENT)
Age: 23
End: 2023-11-14

## 2023-11-14 VITALS
OXYGEN SATURATION: 100 % | DIASTOLIC BLOOD PRESSURE: 82 MMHG | HEART RATE: 91 BPM | HEIGHT: 61 IN | WEIGHT: 192.5 LBS | SYSTOLIC BLOOD PRESSURE: 123 MMHG | TEMPERATURE: 97.9 F | BODY MASS INDEX: 36.35 KG/M2

## 2023-11-14 DIAGNOSIS — H00.021 HORDEOLUM INTERNUM OF RIGHT UPPER EYELID: Primary | ICD-10-CM

## 2023-11-14 RX ORDER — NORELGESTROMIN AND ETHINYL ESTRADIOL 150; 35 UG/D; UG/D
PATCH TRANSDERMAL
COMMUNITY
Start: 2023-08-26

## 2023-11-14 RX ORDER — ERYTHROMYCIN 5 MG/G
OINTMENT OPHTHALMIC
Qty: 1 G | Refills: 0 | Status: SHIPPED | OUTPATIENT
Start: 2023-11-14 | End: 2023-11-24

## 2023-11-14 ASSESSMENT — VISUAL ACUITY: OU: 1
